# Patient Record
Sex: FEMALE | NOT HISPANIC OR LATINO | Employment: FULL TIME | URBAN - NONMETROPOLITAN AREA
[De-identification: names, ages, dates, MRNs, and addresses within clinical notes are randomized per-mention and may not be internally consistent; named-entity substitution may affect disease eponyms.]

---

## 2019-09-08 ENCOUNTER — OFFICE VISIT (OUTPATIENT)
Dept: URGENT CARE | Facility: CLINIC | Age: 39
End: 2019-09-08
Payer: COMMERCIAL

## 2019-09-08 VITALS
TEMPERATURE: 98.6 F | HEART RATE: 100 BPM | RESPIRATION RATE: 16 BRPM | HEIGHT: 68 IN | OXYGEN SATURATION: 96 % | SYSTOLIC BLOOD PRESSURE: 114 MMHG | DIASTOLIC BLOOD PRESSURE: 68 MMHG | BODY MASS INDEX: 20.61 KG/M2 | WEIGHT: 136 LBS

## 2019-09-08 DIAGNOSIS — J30.2 SEASONAL ALLERGIC RHINITIS, UNSPECIFIED TRIGGER: ICD-10-CM

## 2019-09-08 DIAGNOSIS — J45.31 MILD PERSISTENT ASTHMA WITH EXACERBATION: ICD-10-CM

## 2019-09-08 PROCEDURE — 99203 OFFICE O/P NEW LOW 30 MIN: CPT | Performed by: FAMILY MEDICINE

## 2019-09-08 RX ORDER — PREDNISONE 20 MG/1
40 TABLET ORAL DAILY
Qty: 10 TAB | Refills: 0 | Status: SHIPPED | OUTPATIENT
Start: 2019-09-08

## 2019-09-08 RX ORDER — ALBUTEROL SULFATE 90 UG/1
2 AEROSOL, METERED RESPIRATORY (INHALATION) EVERY 6 HOURS PRN
Qty: 8.5 G | Refills: 0 | Status: SHIPPED | OUTPATIENT
Start: 2019-09-08

## 2019-09-08 ASSESSMENT — ENCOUNTER SYMPTOMS
FEVER: 0
VOMITING: 0
HEADACHES: 0
WHEEZING: 1
SORE THROAT: 0
COUGH: 1

## 2019-09-08 NOTE — PROGRESS NOTES
Subjective:     Andreia Mcgowan is a 38 y.o. female who presents for Allergic Rhinitis    HPI  Pt presents for evaluation of a new problem   Pt with difficulties with asthma and allergies for the past 24 hours   Was working out in the yard and feels the exposure flared things up   Took a loratadine and feels it helped a little   Wheezing frequently and needing to use her albuterol inhaler a few times per day  Feels breathing is a little more difficult than usual  No rashes     Review of Systems   Constitutional: Negative for fever.   HENT: Positive for congestion. Negative for sore throat.    Respiratory: Positive for cough and wheezing.    Cardiovascular: Negative for chest pain.   Gastrointestinal: Negative for vomiting.   Skin: Negative for rash.   Neurological: Negative for headaches.       PMH:  has a past medical history of Allergy and ASTHMA.  MEDS:   Current Outpatient Medications:   •  Albuterol (VENTOLIN INH), Inhale  by mouth., Disp: , Rfl:   •  Loratadine (ALAVERT PO), Take  by mouth., Disp: , Rfl:   •  ALBUTEROL 90 MCG/ACT AERS, Inhale 2 Puffs by mouth every 6 hours as needed for Shortness of Breath., Disp: , Rfl:   •  XOPENEX HFA 45 MCG/ACT AERO, Inhale 1-2 Puffs by mouth every four hours as needed for Shortness of Breath., Disp: , Rfl:   •  levalbuterol (XOPENEX) 1.25 MG/3ML NEBU, Inhale 3 mL mist every four hours as needed., Disp: 1 Vial, Rfl: 3  •  albuterol (VENTOLIN OR PROVENTIL) 108 (90 BASE) MCG/ACT AERS, Inhale 2 Puffs by mouth every four hours as needed for Shortness of Breath., Disp: 1 Inhaler, Rfl: 3  •  azithromycin (ZITHROMAX) 250 MG TABS, Take 1 Tab by mouth every day. 2 tabs by mouth day 1, 1 tab by mouth days 2-5, Disp: 1 Each, Rfl: 0  ALLERGIES: No Known Allergies  SURGHX: History reviewed. No pertinent surgical history.  SOCHX:  reports that she has never smoked. She has never used smokeless tobacco. She reports that she drinks alcohol.  FH: Family history was reviewed, not  "contributing to acute illness     Objective:   /68 (BP Location: Left arm, Patient Position: Sitting, BP Cuff Size: Adult)   Pulse 100   Temp 37 °C (98.6 °F) (Temporal)   Resp 16   Ht 1.727 m (5' 8\")   Wt 61.7 kg (136 lb)   SpO2 96%   BMI 20.68 kg/m²     Physical Exam   Constitutional: She appears well-developed and well-nourished. No distress.   HENT:   Head: Normocephalic and atraumatic.   Right Ear: Tympanic membrane, external ear and ear canal normal.   Left Ear: Tympanic membrane, external ear and ear canal normal.   Nose: Mucosal edema and rhinorrhea present.   Mouth/Throat: Uvula is midline, oropharynx is clear and moist and mucous membranes are normal.   Eyes: Conjunctivae and EOM are normal. Right eye exhibits no discharge. Left eye exhibits no discharge. No scleral icterus.   Neck: Normal range of motion. No tracheal deviation present.   Cardiovascular: Normal rate and regular rhythm.   Pulmonary/Chest:   Breathing comfortably on room air, speaking in full sentences, moderate air movement throughout, faint end expiratory wheezes with forced expiration, no focal rales appreciated   Neurological: She is alert. Coordination normal.   Skin: Skin is warm and dry. No rash noted. She is not diaphoretic.   Psychiatric: She has a normal mood and affect. Her behavior is normal. Judgment and thought content normal.     Assessment/Plan:   Assessment    1. Mild persistent asthma with exacerbation  2. Seasonal allergic rhinitis, unspecified trigger  Patient with asthma exacerbation and allergic rhinitis.  She is a good candidate for outpatient treatment and no indications for ER visit.  Will treat with steroids and F/U with allergist.  Pt working on finding new PCP and will make appointment as soon as she is able.    - REFERRAL TO ALLERGY  - predniSONE (DELTASONE) 20 MG Tab; Take 2 Tabs by mouth every day.  Dispense: 10 Tab; Refill: 0  - albuterol 108 (90 Base) MCG/ACT Aero Soln inhalation aerosol; Inhale 2 " Puffs by mouth every 6 hours as needed for Shortness of Breath.  Dispense: 8.5 g; Refill: 0

## 2024-11-13 ENCOUNTER — OFFICE VISIT (OUTPATIENT)
Dept: URGENT CARE | Facility: CLINIC | Age: 44
End: 2024-11-13
Payer: COMMERCIAL

## 2024-11-13 VITALS
SYSTOLIC BLOOD PRESSURE: 122 MMHG | DIASTOLIC BLOOD PRESSURE: 70 MMHG | OXYGEN SATURATION: 97 % | BODY MASS INDEX: 20.96 KG/M2 | WEIGHT: 141.5 LBS | RESPIRATION RATE: 14 BRPM | TEMPERATURE: 98.6 F | HEIGHT: 69 IN | HEART RATE: 96 BPM

## 2024-11-13 DIAGNOSIS — R51.9 SUDDEN ONSET OF SEVERE HEADACHE: ICD-10-CM

## 2024-11-13 PROCEDURE — 99205 OFFICE O/P NEW HI 60 MIN: CPT | Performed by: PHYSICIAN ASSISTANT

## 2024-11-13 ASSESSMENT — ENCOUNTER SYMPTOMS
DIZZINESS: 0
HEADACHES: 1
FEVER: 0
MYALGIAS: 1
SHORTNESS OF BREATH: 0
NAUSEA: 0
CHILLS: 0
SORE THROAT: 0
TINGLING: 0
BLURRED VISION: 0
DOUBLE VISION: 0
COUGH: 0
WEAKNESS: 0
VOMITING: 0
FOCAL WEAKNESS: 0
SENSORY CHANGE: 0

## 2024-11-13 NOTE — PROGRESS NOTES
"Subjective     Andreia Mcgowan is a 43 y.o. female who presents with Headache (Body aches, hurts to cough x today, took otc advil in am )            Patient woke up in the middle of the night with severe headache. She reports has history of occasional headaches but this is the most severe headache she has ever had. She reports pain radiates down into neck and into multiple body joints. She took 2 ibuprofen in the middle of the night and another 2 this morning. Ibuprofen has not helped. She reports 7/10 pain. No nausea or vomiting. No history of migraines. No vision changes, numbness, weakness, facial drooping or slurred speech. No fever or other viral URI symptoms.        Past Medical History:   Diagnosis Date    Allergy     ASTHMA        .No past surgical history on file.    Family History   Problem Relation Age of Onset    Heart Disease Neg Hx     Diabetes Neg Hx        Patient has no known allergies.    Medications, Allergies, and current problem list reviewed today in Epic      Review of Systems   Constitutional:  Negative for chills, fever and malaise/fatigue.   HENT:  Negative for congestion, hearing loss and sore throat.    Eyes:  Negative for blurred vision and double vision.   Respiratory:  Negative for cough and shortness of breath.    Cardiovascular:  Negative for chest pain.   Gastrointestinal:  Negative for nausea and vomiting.   Musculoskeletal:  Positive for myalgias.   Neurological:  Positive for headaches. Negative for dizziness, tingling, sensory change, focal weakness and weakness.        All other systems reviewed and are negative.         Objective     /70 (BP Location: Left arm, Patient Position: Sitting, BP Cuff Size: Adult long)   Pulse 96   Temp 37 °C (98.6 °F) (Temporal)   Resp 14   Ht 1.753 m (5' 9\")   Wt 64.2 kg (141 lb 8 oz)   SpO2 97%   BMI 20.90 kg/m²      Physical Exam  Constitutional:       General: She is not in acute distress.     Appearance: She is not " ill-appearing.   HENT:      Head: Normocephalic and atraumatic.   Eyes:      Conjunctiva/sclera: Conjunctivae normal.   Cardiovascular:      Rate and Rhythm: Normal rate and regular rhythm.   Pulmonary:      Effort: Pulmonary effort is normal. No respiratory distress.      Breath sounds: No stridor. No wheezing.   Skin:     General: Skin is warm and dry.   Neurological:      General: No focal deficit present.      Mental Status: She is alert and oriented to person, place, and time.   Psychiatric:         Mood and Affect: Mood normal.         Behavior: Behavior normal.         Thought Content: Thought content normal.         Judgment: Judgment normal.                             Assessment & Plan        Assessment & Plan  Sudden onset of severe headache          Patient with no history of severe headaches reports worst headache she has ever had.   Patient needs higher level of care and more thorough investigation in the ED where STAT imaging and labs are available.   Recommend ER evaluation.   Patient agrees to go to the ED here in [place]. Risks of not going including death discussed.  PAtient stable upon discharge.    Maddy Snyder P.A.-C.